# Patient Record
Sex: MALE | Race: WHITE | NOT HISPANIC OR LATINO | Employment: PART TIME | URBAN - METROPOLITAN AREA
[De-identification: names, ages, dates, MRNs, and addresses within clinical notes are randomized per-mention and may not be internally consistent; named-entity substitution may affect disease eponyms.]

---

## 2020-09-04 ENCOUNTER — OFFICE VISIT (OUTPATIENT)
Dept: URGENT CARE | Facility: CLINIC | Age: 20
End: 2020-09-04
Payer: COMMERCIAL

## 2020-09-04 VITALS
WEIGHT: 250 LBS | DIASTOLIC BLOOD PRESSURE: 67 MMHG | OXYGEN SATURATION: 99 % | RESPIRATION RATE: 18 BRPM | TEMPERATURE: 97.7 F | HEART RATE: 75 BPM | SYSTOLIC BLOOD PRESSURE: 121 MMHG | BODY MASS INDEX: 31.08 KG/M2 | HEIGHT: 75 IN

## 2020-09-04 DIAGNOSIS — T63.481A INSECT STING ALLERGY, CURRENT REACTION, ACCIDENTAL OR UNINTENTIONAL, INITIAL ENCOUNTER: Primary | ICD-10-CM

## 2020-09-04 DIAGNOSIS — L03.116 CELLULITIS OF LEFT LOWER LEG: ICD-10-CM

## 2020-09-04 PROCEDURE — G0383 LEV 4 HOSP TYPE B ED VISIT: HCPCS | Performed by: FAMILY MEDICINE

## 2020-09-04 RX ORDER — DOXYCYCLINE 100 MG/1
100 TABLET ORAL 2 TIMES DAILY
Qty: 14 TABLET | Refills: 0 | Status: SHIPPED | OUTPATIENT
Start: 2020-09-04 | End: 2020-09-11

## 2020-09-04 NOTE — PATIENT INSTRUCTIONS
Left lower leg suspected insect sting with skin infection  Will treat with antibiotic doxycycline 100 mg-1 tablet by mouth twice daily for 7 days  Take the medication with full glass of water or meal   Use sun protection while taking this medication  You may use Benadryl on as needed basis for itching and hydrocortisone cream also as needed for itching  Follow-up if symptoms persist or worsen despite treatment

## 2020-09-04 NOTE — PROGRESS NOTES
[facility]        NAME: Cb Alva is a 21 y o  male  : 2000    MRN: [id number]  DATE: 2020  TIME: 2:05 PM    Assessment and Plan   Insect sting allergy, current reaction, accidental or unintentional, initial encounter [I95 [id number]]  1  Insect sting allergy, current reaction, accidental or unintentional, initial encounter     2  Cellulitis of left lower leg  doxycycline (ADOXA) 100 MG tablet         Patient Instructions   Left lower leg suspected insect sting with skin infection  Will treat with antibiotic doxycycline 100 mg-1 tablet by mouth twice daily for 7 days  Take the medication with full glass of water or meal   Use sun protection while taking this medication  You may use Benadryl on as needed basis for itching and hydrocortisone cream also as needed for itching  Follow-up if symptoms persist or worsen despite treatment  Follow up with PCP in 3-5 days  Proceed to  ER if symptoms worsen  Chief Complaint     Chief Complaint   Patient presents with    Insect Bite     2 insect bites on left lower leg for 2 days that are itchy and painful         History of Present Illness       Patient presents for evaluation of 2 sting sites on his left leg which occurred 2 days ago from a suspected wasp or hornet  Patient denies any shortness of breath palpitations, fevers or chills  There has been increasing redness and tenderness around the areas of dose suspected sting site on his left calf  No calf swelling or tenderness  No shortness of breath  Review of Systems   Review of Systems   Constitutional: Negative for chills, fatigue and fever  Respiratory: Negative for cough and shortness of breath  Cardiovascular: Negative for chest pain, palpitations and leg swelling  Skin: Positive for rash and wound           Current Medications       Current Outpatient Medications:     doxycycline (ADOXA) 100 MG tablet, Take 1 tablet (100 mg total) by mouth 2 (two) times a day for 7 days, Disp: 14 tablet, Rfl: 0    Current Allergies     Allergies as of 09/04/2020 - Reviewed 09/04/2020   Allergen Reaction Noted    Penicillins  09/04/2020            The following portions of the patient's history were reviewed and updated as appropriate: allergies, current medications, past family history, past medical history, past social history, past surgical history and problem list      History reviewed  No pertinent past medical history  History reviewed  No pertinent surgical history  History reviewed  No pertinent family history  Medications have been verified  Objective   /67   Pulse 75   Temp 97 7 °F (36 5 °C) (Temporal)   Resp 18   Ht 6' 3" (1 905 m)   Wt 113 kg (250 lb)   SpO2 99%   BMI 31 25 kg/m²        Physical Exam     Physical Exam  Constitutional:       General: He is not in acute distress  Appearance: Normal appearance  He is normal weight  He is not ill-appearing, toxic-appearing or diaphoretic  Cardiovascular:      Rate and Rhythm: Normal rate and regular rhythm  Pulses: Normal pulses  Heart sounds: Normal heart sounds  Pulmonary:      Effort: Pulmonary effort is normal  No respiratory distress  Breath sounds: Normal breath sounds  No stridor  No wheezing or rhonchi  Skin:     Comments: Left calf with 2 round lesions of about 2 5 cm with central puncture site  There indurated and tender  No calf swelling otherwise  No calf tenderness  Rosalinda Postal is negative  Skin is warm and dry otherwise  There is no drainage  Neurological:      Mental Status: He is alert

## 2022-02-26 ENCOUNTER — OFFICE VISIT (OUTPATIENT)
Dept: URGENT CARE | Facility: MEDICAL CENTER | Age: 22
End: 2022-02-26
Payer: COMMERCIAL

## 2022-02-26 VITALS
SYSTOLIC BLOOD PRESSURE: 132 MMHG | BODY MASS INDEX: 29.1 KG/M2 | HEART RATE: 98 BPM | TEMPERATURE: 97.7 F | RESPIRATION RATE: 18 BRPM | WEIGHT: 239 LBS | OXYGEN SATURATION: 98 % | DIASTOLIC BLOOD PRESSURE: 88 MMHG | HEIGHT: 76 IN

## 2022-02-26 DIAGNOSIS — S16.1XXA STRAIN OF MUSCLE, FASCIA AND TENDON AT NECK LEVEL, INITIAL ENCOUNTER: Primary | ICD-10-CM

## 2022-02-26 PROCEDURE — G0382 LEV 3 HOSP TYPE B ED VISIT: HCPCS

## 2022-02-26 RX ORDER — BACLOFEN 20 MG/1
20 TABLET ORAL 3 TIMES DAILY
Qty: 30 TABLET | Refills: 0 | Status: SHIPPED | OUTPATIENT
Start: 2022-02-26 | End: 2022-07-21

## 2022-02-26 NOTE — PROGRESS NOTES
[facility]        NAME: Sonya Estrada is a 24 y o  male  : 2000    MRN: [id number]  DATE: 2022  TIME: 5:15 PM    Assessment and Plan   Strain of muscle, fascia and tendon at neck level, initial encounter [S16  1XXA]  1  Strain of muscle, fascia and tendon at neck level, initial encounter  baclofen 20 mg tablet     Strain of trapezius muscle, right side of neck, likely from daily activities or sleep  Patient Instructions   Take baclofen 20 mg tablets as needed every 8 hours for muscle spasms/neck pain  These can be sedating, so be mindful when considering to drive or work when taking medication  Apply heating pad 20 minutes on/off as tolerated  He can try ibuprofen ordered sodium naproxen top alleviate some of the swelling pain  Go to emergency department if you start to experience severe headaches that do not go away after 24 hours, inability to turn head or neck, bone pain down your spine, neck stiffness with fever greater than 100 4° F  Follow up with PCP in 3-5 days  Proceed to  ER if symptoms worsen  Chief Complaint     Chief Complaint   Patient presents with    Neck Pain     right sided neck pain that started last night, no injury noted          History of Present Illness     Sonya Estrada is a 24 y o  male who has complaint of right-sided trapezius neck pain, present for the past day  He experience the pain in the middle today, denies any inciting incident  He has no numbness or tingling  Denies headache, paresthesias, fevers, chills, night sweats, chest pain, shortness of breath, nausea, vomiting, diarrhea  He reports use of icy Hot patch on the neck last night to help him sleep with good benefit  He has been trying ibuprofen with minimal relief  Review of Systems   Review of Systems   Constitutional: Negative for chills, fatigue and fever  Respiratory: Negative for cough and shortness of breath      Cardiovascular: Negative for chest pain and palpitations  Gastrointestinal: Negative for abdominal pain, constipation, diarrhea, nausea and vomiting  Musculoskeletal: Positive for neck pain and neck stiffness  Negative for myalgias  Neurological: Negative for headaches  All other systems reviewed and are negative  Current Medications       Current Outpatient Medications:     baclofen 20 mg tablet, Take 1 tablet (20 mg total) by mouth 3 (three) times a day, Disp: 30 tablet, Rfl: 0    Current Allergies     Allergies as of 02/26/2022 - Reviewed 02/26/2022   Allergen Reaction Noted    Penicillins  09/04/2020            The following portions of the patient's history were reviewed and updated as appropriate: allergies, current medications, past family history, past medical history, past social history, past surgical history and problem list      History reviewed  No pertinent past medical history  History reviewed  No pertinent surgical history  History reviewed  No pertinent family history  Medications have been verified  Objective   /88   Pulse 98   Temp 97 7 °F (36 5 °C)   Resp 18   Ht 6' 4" (1 93 m)   Wt 108 kg (239 lb)   SpO2 98%   BMI 29 09 kg/m²   No LMP for male patient  Physical Exam     Physical Exam  Vitals and nursing note reviewed  Constitutional:       General: He is awake  Appearance: Normal appearance  He is well-developed and well-groomed  Cardiovascular:      Rate and Rhythm: Normal rate and regular rhythm  Heart sounds: Normal heart sounds  Pulmonary:      Effort: Pulmonary effort is normal       Breath sounds: Normal breath sounds  No wheezing, rhonchi or rales  Musculoskeletal:      Right shoulder: Normal  No swelling, tenderness or crepitus  Normal range of motion  Normal strength  Left shoulder: Normal  No swelling, tenderness or crepitus  Normal range of motion  Normal strength  Right upper arm: Normal  No tenderness or bony tenderness        Left upper arm: Normal  No tenderness or bony tenderness  Right elbow: Normal  No swelling  Normal range of motion  No tenderness  Left elbow: Normal  No swelling  Normal range of motion  No tenderness  Right forearm: Normal  No swelling or tenderness  Left forearm: Normal  No swelling or tenderness  Right wrist: Normal  No swelling, tenderness, snuff box tenderness or crepitus  Normal range of motion  Normal pulse  Left wrist: Normal  No swelling, tenderness, snuff box tenderness or crepitus  Normal range of motion  Normal pulse  Right hand: Normal  No tenderness or bony tenderness  Normal range of motion  Normal strength  Normal sensation  Normal capillary refill  Left hand: Normal  No swelling, tenderness or bony tenderness  Normal range of motion  Normal strength  Normal sensation  Normal capillary refill  Cervical back: No swelling, spasms, tenderness or bony tenderness  No pain with movement  Decreased range of motion (Neck flexion to about 45 degrees, right rotation to about 35 degrees)  Thoracic back: Normal  No swelling, spasms, tenderness or bony tenderness  Normal range of motion  Lumbar back: Normal  No swelling, spasms, tenderness or bony tenderness  Normal range of motion  Comments: Bilateral upper extremities neurovascularly intact with capillary refill less than 2 seconds  Neurological:      Mental Status: He is alert  Psychiatric:         Behavior: Behavior is cooperative

## 2022-02-26 NOTE — PATIENT INSTRUCTIONS
Take baclofen 20 mg tablets as needed every 8 hours for muscle spasms/neck pain  These can be sedating, so be mindful when considering to drive or work when taking medication      Go to emergency department if you start to experience severe headaches that do not go away after 24 hours, inability to turn head or neck, bone pain down your spine, neck stiffness with fever greater than 100 4° F

## 2022-07-02 ENCOUNTER — OFFICE VISIT (OUTPATIENT)
Dept: URGENT CARE | Facility: MEDICAL CENTER | Age: 22
End: 2022-07-02
Payer: COMMERCIAL

## 2022-07-02 VITALS
RESPIRATION RATE: 20 BRPM | TEMPERATURE: 98.3 F | HEART RATE: 99 BPM | OXYGEN SATURATION: 98 % | WEIGHT: 232 LBS | BODY MASS INDEX: 28.24 KG/M2 | SYSTOLIC BLOOD PRESSURE: 119 MMHG | DIASTOLIC BLOOD PRESSURE: 78 MMHG

## 2022-07-02 DIAGNOSIS — K21.9 GASTROESOPHAGEAL REFLUX DISEASE, UNSPECIFIED WHETHER ESOPHAGITIS PRESENT: Primary | ICD-10-CM

## 2022-07-02 PROCEDURE — G0381 LEV 2 HOSP TYPE B ED VISIT: HCPCS | Performed by: PHYSICIAN ASSISTANT

## 2022-07-02 RX ORDER — OMEPRAZOLE 20 MG/1
20 CAPSULE, DELAYED RELEASE ORAL DAILY
Qty: 30 CAPSULE | Refills: 0 | Status: SHIPPED | OUTPATIENT
Start: 2022-07-02

## 2022-07-02 NOTE — PROGRESS NOTES
[facility]        NAME: Koki Faulkner is a 24 y o  male  : 2000    MRN: [id number]  DATE: 2022  TIME: 6:18 PM    Assessment and Plan   Gastroesophageal reflux disease, unspecified whether esophagitis present [K21 9]  1  Gastroesophageal reflux disease, unspecified whether esophagitis present  omeprazole (PriLOSEC) 20 mg delayed release capsule         Patient Instructions     Stop Tagamet and start Omeprazole  If symptoms worsen go to the ER for further evaluation  Follow up with PCP in 3-5 days  Proceed to  ER if symptoms worsen  Chief Complaint     Chief Complaint   Patient presents with    Nausea     Worse in the AM along with stomach pain for 3-4 weeks         History of Present Illness       Patient presents with a 3 to four-week history of intermittent nausea  Nausea is primarily occurring 1st thing in the morning  It is not waking him up through the night  It is associated with intermittent stomach discomfort  He has a history of GERD for which he takes Tagamet intermittently for symptoms  No specific food intolerance is such as fatty foods  He has an upcoming new patient appointment with Dr Eliana Williamson later this month  Review of Systems   Review of Systems   Constitutional: Negative for chills and fever  Respiratory: Negative for cough  Gastrointestinal: Positive for abdominal pain (Discomfort) and nausea  Negative for diarrhea and vomiting           Current Medications       Current Outpatient Medications:     cimetidine (TAGAMET) 200 mg tablet, Take 200 mg by mouth 4 (four) times a day, Disp: , Rfl:     omeprazole (PriLOSEC) 20 mg delayed release capsule, Take 1 capsule (20 mg total) by mouth daily, Disp: 30 capsule, Rfl: 0    baclofen 20 mg tablet, Take 1 tablet (20 mg total) by mouth 3 (three) times a day (Patient not taking: Reported on 2022), Disp: 30 tablet, Rfl: 0    Current Allergies     Allergies as of 2022 - Reviewed 2022   Allergen Reaction Noted    Amoxicillin Other (See Comments) 07/02/2022    Penicillins  09/04/2020            The following portions of the patient's history were reviewed and updated as appropriate: allergies, current medications, past family history, past medical history, past social history, past surgical history and problem list      Past Medical History:   Diagnosis Date    GERD (gastroesophageal reflux disease)        No past surgical history on file  No family history on file  Medications have been verified  Objective   /78   Pulse 99   Temp 98 3 °F (36 8 °C)   Resp 20   Wt 105 kg (232 lb)   SpO2 98%   BMI 28 24 kg/m²   No LMP for male patient  Physical Exam     Physical Exam  Vitals and nursing note reviewed  Constitutional:       Appearance: Normal appearance  HENT:      Head: Normocephalic and atraumatic  Cardiovascular:      Rate and Rhythm: Normal rate and regular rhythm  Pulmonary:      Effort: Pulmonary effort is normal    Abdominal:      Tenderness: There is abdominal tenderness (Mild generalized vague tenderness)  There is no guarding or rebound  Skin:     General: Skin is warm  Neurological:      Mental Status: He is alert

## 2022-07-21 ENCOUNTER — OFFICE VISIT (OUTPATIENT)
Dept: FAMILY MEDICINE CLINIC | Facility: CLINIC | Age: 22
End: 2022-07-21
Payer: COMMERCIAL

## 2022-07-21 VITALS
TEMPERATURE: 97 F | HEIGHT: 76 IN | SYSTOLIC BLOOD PRESSURE: 114 MMHG | HEART RATE: 99 BPM | OXYGEN SATURATION: 97 % | DIASTOLIC BLOOD PRESSURE: 76 MMHG | WEIGHT: 234.6 LBS | BODY MASS INDEX: 28.57 KG/M2

## 2022-07-21 DIAGNOSIS — K21.9 GASTROESOPHAGEAL REFLUX DISEASE WITHOUT ESOPHAGITIS: Primary | ICD-10-CM

## 2022-07-21 DIAGNOSIS — J30.1 SEASONAL ALLERGIC RHINITIS DUE TO POLLEN: ICD-10-CM

## 2022-07-21 DIAGNOSIS — Z13.6 SCREENING FOR CARDIOVASCULAR CONDITION: ICD-10-CM

## 2022-07-21 PROCEDURE — 99203 OFFICE O/P NEW LOW 30 MIN: CPT | Performed by: FAMILY MEDICINE

## 2022-07-21 PROCEDURE — 3725F SCREEN DEPRESSION PERFORMED: CPT | Performed by: FAMILY MEDICINE

## 2022-07-21 RX ORDER — FAMOTIDINE 40 MG/1
40 TABLET, FILM COATED ORAL 2 TIMES DAILY
COMMUNITY
Start: 2022-07-20 | End: 2022-09-29

## 2022-07-21 NOTE — PROGRESS NOTES
Assessment/Plan:  Patient will continue to follow-up with GI  Lipid panel ordered  Continue to watch diet, and encouraged him to exercise  Follow-up in 6 months or p r n     Problem List Items Addressed This Visit        Digestive    Gastroesophageal reflux disease without esophagitis - Primary    Relevant Medications    famotidine (PEPCID) 40 MG tablet       Respiratory    Allergic rhinitis      Other Visit Diagnoses     BMI 28 0-28 9,adult        Screening for cardiovascular condition        Relevant Orders    Lipid Panel with Direct LDL reflex           Diagnoses and all orders for this visit:    Gastroesophageal reflux disease without esophagitis    Seasonal allergic rhinitis due to pollen    BMI 28 0-28 9,adult    Screening for cardiovascular condition  -     Lipid Panel with Direct LDL reflex    Other orders  -     famotidine (PEPCID) 40 MG tablet; Take 40 mg by mouth 2 (two) times a day      No problem-specific Assessment & Plan notes found for this encounter  Subjective:      Patient ID: James Merchant is a 24 y o  male  The patient  Patient denies any chest pain or shortness of breath  Patient has been getting frequent GERD symptoms  He is actually going to get an EGD tomorrow  He is on both omeprazole and Pepcid right now  Otherwise patient has no other concerns  He does get seasonal allergies, he takes Xyzal for that  Paternal grandmother had breast cancer  Paternal grandfather had cancer, unknown type  No diabetes or heart disease in the family      The following portions of the patient's history were reviewed and updated as appropriate:   He has a past medical history of GERD (gastroesophageal reflux disease)  ,  does not have any pertinent problems on file  ,   has no past surgical history on file  ,  family history includes No Known Problems in his mother  ,   reports that he has never smoked  He has never used smokeless tobacco  He reports current alcohol use   He reports that he does not use drugs  ,  is allergic to amoxicillin, amoxicillin-pot clavulanate, and penicillins     Current Outpatient Medications   Medication Sig Dispense Refill    famotidine (PEPCID) 40 MG tablet Take 40 mg by mouth 2 (two) times a day      omeprazole (PriLOSEC) 20 mg delayed release capsule Take 1 capsule (20 mg total) by mouth daily 30 capsule 0     No current facility-administered medications for this visit  Review of Systems   Constitutional: Negative  Respiratory: Negative  Cardiovascular: Negative  Gastrointestinal:        GERD   Genitourinary: Negative  Objective:  Vitals:    07/21/22 1235   BP: 114/76   Pulse: 99   Temp: (!) 97 °F (36 1 °C)   SpO2: 97%   Weight: 106 kg (234 lb 9 6 oz)   Height: 6' 4" (1 93 m)     Body mass index is 28 56 kg/m²  Physical Exam  Vitals reviewed  Constitutional:       General: He is not in acute distress  Appearance: Normal appearance  He is well-developed  He is not ill-appearing, toxic-appearing or diaphoretic  HENT:      Head: Normocephalic and atraumatic  Eyes:      Conjunctiva/sclera: Conjunctivae normal    Cardiovascular:      Rate and Rhythm: Normal rate and regular rhythm  Heart sounds: Normal heart sounds  No murmur heard  No friction rub  No gallop  Pulmonary:      Effort: Pulmonary effort is normal  No respiratory distress  Breath sounds: Normal breath sounds  No wheezing, rhonchi or rales  Musculoskeletal:      Right lower leg: No edema  Left lower leg: No edema  Neurological:      General: No focal deficit present  Mental Status: He is alert and oriented to person, place, and time  Psychiatric:         Mood and Affect: Mood normal          Behavior: Behavior normal          Thought Content: Thought content normal          Judgment: Judgment normal          BMI Counseling: Body mass index is 28 56 kg/m²   The BMI is above normal  Nutrition recommendations include reducing portion sizes, decreasing overall calorie intake, 3-5 servings of fruits/vegetables daily, reducing fast food intake, consuming healthier snacks, decreasing soda and/or juice intake, moderation in carbohydrate intake, increasing intake of lean protein, reducing intake of saturated fat and trans fat and reducing intake of cholesterol

## 2022-07-21 NOTE — PATIENT INSTRUCTIONS

## 2022-09-02 LAB — HCV AB SER-ACNC: NEGATIVE

## 2022-09-29 ENCOUNTER — OFFICE VISIT (OUTPATIENT)
Dept: FAMILY MEDICINE CLINIC | Facility: CLINIC | Age: 22
End: 2022-09-29
Payer: COMMERCIAL

## 2022-09-29 VITALS
DIASTOLIC BLOOD PRESSURE: 88 MMHG | OXYGEN SATURATION: 97 % | TEMPERATURE: 96.1 F | BODY MASS INDEX: 30.69 KG/M2 | SYSTOLIC BLOOD PRESSURE: 134 MMHG | HEART RATE: 91 BPM | WEIGHT: 252 LBS | HEIGHT: 76 IN

## 2022-09-29 DIAGNOSIS — F32.0 CURRENT MILD EPISODE OF MAJOR DEPRESSIVE DISORDER WITHOUT PRIOR EPISODE (HCC): Primary | ICD-10-CM

## 2022-09-29 PROCEDURE — 99213 OFFICE O/P EST LOW 20 MIN: CPT | Performed by: FAMILY MEDICINE

## 2022-09-29 PROCEDURE — 3725F SCREEN DEPRESSION PERFORMED: CPT | Performed by: FAMILY MEDICINE

## 2022-09-29 RX ORDER — MONTELUKAST SODIUM 10 MG/1
TABLET ORAL
COMMUNITY
Start: 2022-09-02

## 2022-09-29 NOTE — PROGRESS NOTES
Name: Charley Mayberry      : 2000      MRN: [id number]  Encounter Provider: Rosio Manuel DO  Encounter Date: 2022   Encounter department: [address] [place] [address]   Referral made to behavior Health  I also told patient to call us insurance to see where he can go for counseling  I gave him information on the [address] [place] [address] walk-in behavioral center  He does not wish to start any medications at this time  Follow-up GI as scheduled  Follow-up here in 1 month or p r n  1  Current mild episode of major depressive disorder without prior episode [facility])  -     Ambulatory Referral to [facility]; Future      Depression Screening and Follow-up Plan: Patient was screened for depression during today's encounter  They screened negative with a PHQ-2 score of 2  Subjective     Patient here today stating that he has been feeling more depressed  He is following up with GI for his stomach issues  Denies any SI or HI  He has trouble falling asleep  He prefers not to take any medication, would like to talk to somebody  Review of Systems   Constitutional: Negative  Respiratory: Negative  Cardiovascular: Negative  Gastrointestinal: Negative  Genitourinary: Negative  Psychiatric/Behavioral: Positive for dysphoric mood and sleep disturbance  Negative for suicidal ideas  Past Medical History:   Diagnosis Date    GERD (gastroesophageal reflux disease)      History reviewed  No pertinent surgical history    Family History   Problem Relation Age of Onset    No Known Problems Mother      Social History     Socioeconomic History    Marital status: Single     Spouse name: None    Number of children: None    Years of education: None    Highest education level: None   Occupational History    None   Tobacco Use    Smoking status: Never Smoker    Smokeless tobacco: Never Used   Vaping Use    Vaping Use: Never used   Substance and Sexual Activity    Alcohol use: Yes     Comment: Rarely    Drug use: Never    Sexual activity: None   Other Topics Concern    None   Social History Narrative    None     Social Determinants of Health     Financial Resource Strain: Not on file   Food Insecurity: Not on file   Transportation Needs: Not on file   Physical Activity: Not on file   Stress: Not on file   Social Connections: Not on file   Intimate Partner Violence: Not on file   Housing Stability: Not on file     Current Outpatient Medications on File Prior to Visit   Medication Sig    montelukast (SINGULAIR) 10 mg tablet     omeprazole (PriLOSEC) 20 mg delayed release capsule Take 1 capsule (20 mg total) by mouth daily (Patient taking differently: Take 20 mg by mouth 2 (two) times a day)    [DISCONTINUED] famotidine (PEPCID) 40 MG tablet Take 40 mg by mouth 2 (two) times a day (Patient not taking: Reported on 9/29/2022)     Allergies   Allergen Reactions    Amoxicillin Other (See Comments)     Pt states unaware of reaction type    Amoxicillin-Pot Clavulanate Other (See Comments)    Penicillins      Immunization History   Administered Date(s) Administered    DTaP 2000, 2000, 02/07/2001, 03/08/2002, 08/09/2005    Hep B, Adolescent or Pediatric 2000, 2000, 05/08/2001    Hib (PRP-T) 2000, 2000, 02/07/2001, 11/12/2001    IPV 2000, 2000, 03/08/2002, 08/09/2005    Influenza, seasonal, injectable 12/08/2005, 11/18/2006, 11/03/2007, 11/01/2008, 10/24/2009    MMR 11/12/2001, 08/09/2005    Pneumococcal Conjugate PCV 7 2000, 2000, 02/07/2001    Varicella 08/20/2001, 08/20/2007       Objective     /88   Pulse 91   Temp (!) 96 1 °F (35 6 °C)   Ht 6' 4" (1 93 m)   Wt 114 kg (252 lb)   SpO2 97%   BMI 30 67 kg/m²     Physical Exam  Vitals reviewed  Constitutional:       General: He is not in acute distress  Appearance: Normal appearance  He is well-developed   He is not ill-appearing, toxic-appearing or diaphoretic  HENT:      Head: Normocephalic and atraumatic  Eyes:      Conjunctiva/sclera: Conjunctivae normal    Cardiovascular:      Rate and Rhythm: Normal rate and regular rhythm  Heart sounds: Normal heart sounds  No murmur heard  No friction rub  No gallop  Pulmonary:      Effort: Pulmonary effort is normal  No respiratory distress  Breath sounds: Normal breath sounds  No wheezing, rhonchi or rales  Musculoskeletal:      Right lower leg: No edema  Left lower leg: No edema  Neurological:      General: No focal deficit present  Mental Status: He is alert and oriented to person, place, and time  Psychiatric:         Mood and Affect: Mood normal          Behavior: Behavior normal          Thought Content:  Thought content normal          Judgment: Judgment normal        Lucyann Bending, DO

## 2023-07-20 ENCOUNTER — OFFICE VISIT (OUTPATIENT)
Dept: URGENT CARE | Facility: MEDICAL CENTER | Age: 23
End: 2023-07-20
Payer: COMMERCIAL

## 2023-07-20 VITALS
TEMPERATURE: 98.3 F | BODY MASS INDEX: 29.52 KG/M2 | HEIGHT: 77 IN | RESPIRATION RATE: 20 BRPM | HEART RATE: 100 BPM | WEIGHT: 250 LBS | OXYGEN SATURATION: 98 %
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
## 2023-07-20 DIAGNOSIS — S50.369A: Primary | ICD-10-CM

## 2023-07-20 PROCEDURE — G0382 LEV 3 HOSP TYPE B ED VISIT: HCPCS | Performed by: PHYSICIAN ASSISTANT

## 2023-07-20 RX ORDER — TRIAMCINOLONE ACETONIDE 1 MG/G
CREAM TOPICAL 3 TIMES DAILY
Qty: 45 G | Refills: 1 | Status: SHIPPED | OUTPATIENT
Start: 2023-07-20

## 2023-07-20 NOTE — PROGRESS NOTES
[facility]        NAME: Khushi Mckinney is a 25 y.o. male  : 2000    MRN: [id number]  DATE: 2023  TIME: 3:27 PM    Assessment and Plan   Insect bite of elbow, unspecified laterality, initial encounter Anju King. XXXA]  1. Insect bite of elbow, unspecified laterality, initial encounter  triamcinolone (KENALOG) 0.1 % cream            Patient Instructions       Follow up with PCP in 3-5 days. Proceed to  ER if symptoms worsen. Chief Complaint     Chief Complaint   Patient presents with   • Insect Bite     Insect bites to b/l elbows and face, neck, area is red, soft and itchy, noticed around 1 pm yesterday          History of Present Illness       Patient presents with several insect bites of face and elbows which occurred yesterday. Areas are very itchy. No fevers. The one on left elbow was more swollen yesterday. Review of Systems   Review of Systems   Constitutional: Negative for fever. Musculoskeletal: Negative for arthralgias.    Skin:        Insect bites         Current Medications       Current Outpatient Medications:   •  montelukast (SINGULAIR) 10 mg tablet, , Disp: , Rfl:   •  omeprazole (PriLOSEC) 20 mg delayed release capsule, Take 1 capsule (20 mg total) by mouth daily (Patient taking differently: Take 20 mg by mouth 2 (two) times a day), Disp: 30 capsule, Rfl: 0  •  triamcinolone (KENALOG) 0.1 % cream, Apply topically 3 (three) times a day, Disp: 45 g, Rfl: 1    Current Allergies     Allergies as of 2023 - Reviewed 2023   Allergen Reaction Noted   • Amoxicillin Other (See Comments) 2022   • Amoxicillin-pot clavulanate Other (See Comments) 10/09/2021   • Penicillins  2020            The following portions of the patient's history were reviewed and updated as appropriate: allergies, current medications, past family history, past medical history, past social history, past surgical history and problem list.     Past Medical History:   Diagnosis Date   • GERD (gastroesophageal reflux disease)        History reviewed. No pertinent surgical history. Family History   Problem Relation Age of Onset   • No Known Problems Mother          Medications have been verified. Objective   Pulse 100   Temp 98.3 °F (36.8 °C)   Resp 20   Ht 6' 5" (1.956 m)   Wt 113 kg (250 lb)   SpO2 98%   BMI 29.65 kg/m²   No LMP for male patient. Physical Exam     Physical Exam  Vitals and nursing note reviewed. Constitutional:       Appearance: Normal appearance. HENT:      Head: Normocephalic and atraumatic. Cardiovascular:      Rate and Rhythm: Normal rate and regular rhythm. Pulmonary:      Effort: Pulmonary effort is normal.   Skin:     General: Skin is warm. Comments: Raised erythematous  Bites left and right elbows, neck and face which appear consistent with mosquito bites. Left elbow with mild swelling. No joint pain. FROM of left elbow. Neurological:      Mental Status: He is alert.

## 2025-01-23 ENCOUNTER — OFFICE VISIT (OUTPATIENT)
Dept: URGENT CARE | Facility: MEDICAL CENTER | Age: 25
End: 2025-01-23
Payer: COMMERCIAL

## 2025-01-23 VITALS
WEIGHT: 217 LBS | SYSTOLIC BLOOD PRESSURE: 118 MMHG | DIASTOLIC BLOOD PRESSURE: 84 MMHG | RESPIRATION RATE: 19 BRPM | HEIGHT: 76 IN | BODY MASS INDEX: 26.42 KG/M2 | TEMPERATURE: 97.8 F | HEART RATE: 75 BPM | OXYGEN SATURATION: 98 %

## 2025-01-23 DIAGNOSIS — J01.90 ACUTE NON-RECURRENT SINUSITIS, UNSPECIFIED LOCATION: Primary | ICD-10-CM

## 2025-01-23 PROCEDURE — G0381 LEV 2 HOSP TYPE B ED VISIT: HCPCS | Performed by: PHYSICIAN ASSISTANT

## 2025-01-23 RX ORDER — DOXYCYCLINE HYCLATE 100 MG
100 TABLET ORAL 2 TIMES DAILY
Qty: 14 TABLET | Refills: 0 | Status: SHIPPED | OUTPATIENT
Start: 2025-01-23 | End: 2025-01-30

## 2025-01-23 NOTE — LETTER
January 23, 2025     Patient: Pedro Rebollar   YOB: 2000   Date of Visit: 1/23/2025       To Whom It May Concern:    It is my medical opinion that Pedro Rebollar may return to work on 01/24/25 .    If you have any questions or concerns, please don't hesitate to call.         Sincerely,        Digna Groves PA-C    CC: No Recipients

## 2025-01-23 NOTE — PATIENT INSTRUCTIONS
Start antibiotic  Avoid taking Doxy with dairy products  Over the counter cold medication to control symptoms  Drink plenty of fluids  If symptoms fail to improve follow up with PCP  If symptoms worsen have yourself rechecked

## 2025-01-23 NOTE — PROGRESS NOTES
[facility]        NAME: Pedro Rebollar is a 24 y.o. male  : 2000    MRN: [id number]  DATE: 2025  TIME: 11:51 AM    Assessment and Plan   Acute non-recurrent sinusitis, unspecified location [J01.90]  1. Acute non-recurrent sinusitis, unspecified location  doxycycline hyclate (VIBRA-TABS) 100 mg tablet            Patient Instructions     Start antibiotic  Avoid taking Doxy with dairy products  Over the counter cold medication to control symptoms  Drink plenty of fluids  If symptoms fail to improve follow up with PCP  If symptoms worsen have yourself rechecked    Follow up with PCP in 3-5 days.  Proceed to  ER if symptoms worsen.    If tests have been performed at [facility], our office will contact you with results if changes need to be made to the care plan discussed with you at the visit.  [facility]    Chief Complaint     Chief Complaint   Patient presents with   • Cold Like Symptoms     Cough, chest congestion, sinus pressure and congestion x 2 weeks          History of Present Illness       2-week history of cold-like symptoms.  And now having thick purulent nasal drainage sinus pressure productive cough, body aches and headaches.  Patient denies fever or chills.     patient presents today to    Review of Systems   Review of Systems   Constitutional:  Negative for chills and fever.   HENT:  Positive for congestion, rhinorrhea and sinus pressure. Negative for sore throat.    Respiratory:  Positive for cough. Negative for shortness of breath and wheezing.    Gastrointestinal:  Negative for diarrhea, nausea and vomiting.   Musculoskeletal:  Positive for myalgias.   Neurological:  Positive for headaches.         Current Medications       Current Outpatient Medications:   •  doxycycline hyclate (VIBRA-TABS) 100 mg tablet, Take 1 tablet (100 mg total) by mouth 2 (two) times a day for 7 days, Disp: 14 tablet, Rfl: 0  •  montelukast (SINGULAIR) 10 mg  "tablet, , Disp: , Rfl:   •  omeprazole (PriLOSEC) 20 mg delayed release capsule, Take 1 capsule (20 mg total) by mouth daily, Disp: 30 capsule, Rfl: 0  •  triamcinolone (KENALOG) 0.1 % cream, Apply topically 3 (three) times a day, Disp: 45 g, Rfl: 1    Current Allergies     Allergies as of 01/23/2025 - Reviewed 01/23/2025   Allergen Reaction Noted   • Amoxicillin Other (See Comments) 07/02/2022   • Amoxicillin-pot clavulanate Other (See Comments) 10/09/2021   • Penicillins  09/04/2020            The following portions of the patient's history were reviewed and updated as appropriate: allergies, current medications, past family history, past medical history, past social history, past surgical history and problem list.     Past Medical History:   Diagnosis Date   • GERD (gastroesophageal reflux disease)        No past surgical history on file.    Family History   Problem Relation Age of Onset   • No Known Problems Mother          Medications have been verified.        Objective   /84   Pulse 75   Temp 97.8 °F (36.6 °C)   Resp 19   Ht 6' 3.5\" (1.918 m)   Wt 98.4 kg (217 lb)   SpO2 98%   BMI 26.77 kg/m²   No LMP for male patient.       Physical Exam     Physical Exam  Vitals and nursing note reviewed.   Constitutional:       Appearance: Normal appearance.   HENT:      Head: Normocephalic and atraumatic.      Right Ear: Tympanic membrane normal.      Left Ear: Tympanic membrane normal.      Nose: Congestion and rhinorrhea present.      Mouth/Throat:      Mouth: Mucous membranes are moist.      Pharynx: Oropharynx is clear.   Eyes:      Conjunctiva/sclera: Conjunctivae normal.   Cardiovascular:      Rate and Rhythm: Normal rate and regular rhythm.      Heart sounds: Normal heart sounds.   Pulmonary:      Effort: Pulmonary effort is normal.      Breath sounds: Normal breath sounds.   Musculoskeletal:      Cervical back: Neck supple.   Lymphadenopathy:      Cervical: No cervical adenopathy.   Skin:     General: " Skin is warm.   Neurological:      Mental Status: He is alert.

## 2025-06-05 ENCOUNTER — OFFICE VISIT (OUTPATIENT)
Dept: URGENT CARE | Facility: MEDICAL CENTER | Age: 25
End: 2025-06-05
Payer: COMMERCIAL

## 2025-06-05 VITALS
BODY MASS INDEX: 27.4 KG/M2 | WEIGHT: 225 LBS | SYSTOLIC BLOOD PRESSURE: 113 MMHG | OXYGEN SATURATION: 99 % | RESPIRATION RATE: 18 BRPM | HEART RATE: 96 BPM | HEIGHT: 76 IN | DIASTOLIC BLOOD PRESSURE: 80 MMHG | TEMPERATURE: 98.1 F

## 2025-06-05 DIAGNOSIS — J01.90 ACUTE NON-RECURRENT SINUSITIS, UNSPECIFIED LOCATION: Primary | ICD-10-CM

## 2025-06-05 PROCEDURE — G0382 LEV 3 HOSP TYPE B ED VISIT: HCPCS | Performed by: PHYSICIAN ASSISTANT

## 2025-06-05 RX ORDER — METHYLPREDNISOLONE 4 MG/1
TABLET ORAL
Qty: 1 EACH | Refills: 0 | Status: SHIPPED | OUTPATIENT
Start: 2025-06-05

## 2025-06-05 RX ORDER — DOXYCYCLINE HYCLATE 100 MG
100 TABLET ORAL 2 TIMES DAILY
Qty: 14 TABLET | Refills: 0 | Status: SHIPPED | OUTPATIENT
Start: 2025-06-05 | End: 2025-06-12

## 2025-06-05 NOTE — LETTER
June 5, 2025     Patient: Pedro Rebollar   YOB: 2000   Date of Visit: 6/5/2025       To Whom It May Concern:    It is my medical opinion that Pedro Rebollar may return to work on 06/08/2025.    If you have any questions or concerns, please don't hesitate to call.         Sincerely,        Digna Groves PA-C    CC: No Recipients

## 2025-06-05 NOTE — PATIENT INSTRUCTIONS
Start antibiotic  Do not take with dairy products, be cautious of sunburn while taking antibiotic  Start Medrol Dosepak  Over the counter cold medication to control symptoms  Drink plenty of fluids  If symptoms fail to improve follow up with PCP  If symptoms worsen have yourself rechecked

## 2025-06-05 NOTE — PROGRESS NOTES
[facility]        NAME: Pedro Rebollar is a 24 y.o. male  : 2000    MRN: [id number]  DATE: 2025  TIME: 12:32 PM    Assessment and Plan   Acute non-recurrent sinusitis, unspecified location [J01.90]  1. Acute non-recurrent sinusitis, unspecified location  methylPREDNISolone 4 MG tablet therapy pack    doxycycline hyclate (VIBRA-TABS) 100 mg tablet            Patient Instructions     Start antibiotic  Do not take with dairy products, be cautious of sunburn while taking antibiotic  Start Medrol Dosepak  Over the counter cold medication to control symptoms  Drink plenty of fluids  If symptoms fail to improve follow up with PCP  If symptoms worsen have yourself rechecked    Follow up with PCP in 3-5 days.  Proceed to  ER if symptoms worsen.    If tests have been performed at [facility], our office will contact you with results if changes need to be made to the care plan discussed with you at the visit.  [facility]    Chief Complaint     Chief Complaint   Patient presents with    Cold Like Symptoms     Bilateral ear pain, pressure, prod cough, stuffy nose x 1 week.         History of Present Illness       Patient with a history of allergic rhinitis presents with sinus pressure, ear pressure and nasal congestion.  He denies fever or chills.  States mucus is extremely thick.        Review of Systems   Review of Systems   Constitutional:  Negative for chills and fever.   HENT:  Positive for congestion, ear pain and sinus pressure.    Respiratory:  Negative for cough.          Current Medications     Current Medications[1]    Current Allergies     Allergies as of 2025 - Reviewed 2025   Allergen Reaction Noted    Penicillins Rash 2001    Amoxicillin Other (See Comments) 2022    Amoxicillin-pot clavulanate Other (See Comments) 10/09/2021            The following portions of the patient's history were reviewed and updated as appropriate:  "allergies, current medications, past family history, past medical history, past social history, past surgical history and problem list.     Past Medical History[2]    Past Surgical History[3]    Family History[4]      Medications have been verified.        Objective   /80   Pulse 96   Temp 98.1 °F (36.7 °C)   Resp 18   Ht 6' 4\" (1.93 m)   Wt 102 kg (225 lb)   SpO2 99%   BMI 27.39 kg/m²   No LMP for male patient.       Physical Exam     Physical Exam  Vitals and nursing note reviewed.   Constitutional:       Appearance: Normal appearance.   HENT:      Head: Normocephalic and atraumatic.      Right Ear: Tympanic membrane normal.      Left Ear: Tympanic membrane normal.      Nose: Congestion present.      Right Sinus: Maxillary sinus tenderness present. No frontal sinus tenderness.      Left Sinus: Maxillary sinus tenderness present. No frontal sinus tenderness.      Mouth/Throat:      Mouth: Mucous membranes are moist.      Pharynx: Oropharynx is clear.     Eyes:      Conjunctiva/sclera: Conjunctivae normal.       Cardiovascular:      Rate and Rhythm: Normal rate and regular rhythm.      Heart sounds: Normal heart sounds.   Pulmonary:      Effort: Pulmonary effort is normal.      Breath sounds: Normal breath sounds.     Musculoskeletal:      Cervical back: Neck supple.   Lymphadenopathy:      Cervical: No cervical adenopathy.     Skin:     General: Skin is warm.     Neurological:      Mental Status: He is alert.                        [1]   Current Outpatient Medications:     doxycycline hyclate (VIBRA-TABS) 100 mg tablet, Take 1 tablet (100 mg total) by mouth 2 (two) times a day for 7 days, Disp: 14 tablet, Rfl: 0    methylPREDNISolone 4 MG tablet therapy pack, Use as directed on package, Disp: 1 each, Rfl: 0    montelukast (SINGULAIR) 10 mg tablet, , Disp: , Rfl:     omeprazole (PriLOSEC) 20 mg delayed release capsule, Take 1 capsule (20 mg total) by mouth daily, Disp: 30 capsule, Rfl: 0    " triamcinolone (KENALOG) 0.1 % cream, Apply topically 3 (three) times a day (Patient not taking: Reported on 6/5/2025), Disp: 45 g, Rfl: 1  [2]   Past Medical History:  Diagnosis Date    GERD (gastroesophageal reflux disease)    [3] No past surgical history on file.  [4]   Family History  Problem Relation Name Age of Onset    No Known Problems Mother      No Known Problems Father